# Patient Record
(demographics unavailable — no encounter records)

---

## 2025-01-07 NOTE — HISTORY OF PRESENT ILLNESS
[de-identified] : S/P left lumpectomy and sentinel node Now on chemo and herceptin Will get RT when chemo finishes No complaints

## 2025-01-07 NOTE — PHYSICAL EXAM
[Normal] : supple, no neck mass and thyroid not enlarged [Normal Supraclavicular Lymph Nodes] : normal supraclavicular lymph nodes [Normal Axillary Lymph Nodes] : normal axillary lymph nodes [Normal] : oriented to person, place and time, with appropriate affect [de-identified] : no masses

## 2025-01-07 NOTE — ASSESSMENT
[FreeTextEntry1] : IMP:  MACARIO on adjuvant chemotherapy and herceptin  Plan: Finish chemo Continue herceptin fro one year RT after chemo Arimidex after RT mammogram 6 months after RT

## 2025-01-27 NOTE — REVIEW OF SYSTEMS
[Negative] : Allergic/Immunologic [FreeTextEntry5] : HTN, HLD [FreeTextEntry7] : cholycystectomy [de-identified] : left breast lumpectomy in 9/2024, h/o chemo for breast cancer in early 1/2025

## 2025-01-27 NOTE — HISTORY OF PRESENT ILLNESS
[FreeTextEntry1] : 70 y/o female with HTN, HLD, h/o left breast lumpectomy (IDC, pT1cN0, ERPR positive, Her 2 positive), h/o chemo for left breast cancer in early 1/2025 presents to discuss radiation options for left breast cancer.  6/2024 -- mammography @[facility] showed BI-RADS 0. Left breast asymmetry and possible nodules. 7/2024 -- mammography showed left breast mass, BI-RADS 5. 7/2024 -- left breast biopsy showed IDC. ER, [place] and her 2 positive.  9/2024 -- left breast lumpectomy showed Final Diagnosis (pT1cN0, Negative margin, triple positive) 1.  Axillary sentinel lymph nodes, left, excision -   [place] lymph nodes, negative for carcinoma (0/4), pN0 -   Immunostains for pankeratin pending, addendum to follow 2.  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 -  See prior biopsy report and synoptic 3.  Left breast, resection: -   Focal adenosis with atypical apocrine metaplasia (atypical apocrine adenosis) -   Fibrocystic changes and microcalcifications -   Negative for in situ and invasive carcinoma 4-8, Left breast, superior, medial, inferior, lateral and deep margins: -   All margins are negative for carcinoma.  10/4/2024 Consultation. No breast pain. Pt may get chemo with Med Onc Dr Montoya @ [place].   1/22/2025 F/U. Pt completed chemo with Dr Montoya, here for RT plan.

## 2025-01-27 NOTE — HISTORY OF PRESENT ILLNESS
[FreeTextEntry1] : 70 y/o female with HTN, HLD, h/o left breast lumpectomy (IDC, pT1cN0, ERPR positive, Her 2 positive), h/o chemo for left breast cancer in early 1/2025 presents to discuss radiation options for left breast cancer.  6/2024 -- mammography @[facility] showed BI-RADS 0. Left breast asymmetry and possible nodules. 7/2024 -- mammography showed left breast mass, BI-RADS 5. 7/2024 -- left breast biopsy showed IDC. ER, [place] and her 2 positive.  9/2024 -- left breast lumpectomy showed Final Diagnosis (pT1cN0, Negative margin, triple positive) 1.  Axillary sentinel lymph nodes, left, excision -   [place] lymph nodes, negative for carcinoma (0/4), pN0 -   Immunostains for pankeratin pending, addendum to follow 2.  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 -  See prior biopsy report and synoptic 3.  Left breast, resection: -   Focal adenosis with atypical apocrine metaplasia (atypical apocrine adenosis) -   Fibrocystic changes and microcalcifications -   Negative for in situ and invasive carcinoma 4-8, Left breast, superior, medial, inferior, lateral and deep margins: -   All margins are negative for carcinoma.  10/4/2024 Consultation. No breast pain. Pt may get chemo with Med Onc Dr Montoya @ [place].   1/22/2025 F/U. Pt completed chemo with Dr Montoya, here for RT plan.

## 2025-01-27 NOTE — REVIEW OF SYSTEMS
[Negative] : Allergic/Immunologic [FreeTextEntry5] : HTN, HLD [FreeTextEntry7] : cholycystectomy [de-identified] : left breast lumpectomy in 9/2024, h/o chemo for breast cancer in early 1/2025

## 2025-02-20 NOTE — DISEASE MANAGEMENT
[Pathological] : TNM Stage: p [I] : I [TTNM] : 1c [NTNM] : 0 [MTNM] : x [de-identified] : 5240 cGy [de-identified] : left breast

## 2025-02-20 NOTE — REASON FOR VISIT
[Routine On-Treatment] : a routine on-treatment visit for [Breast Cancer] : breast cancer [Family Member] : family member [Language Line ] : provided by Language Line   [Time Spent: ____ minutes] : Total time spent using  services: [unfilled] minutes. The patient's primary language is not English thus required  services. [Interpreters_IDNumber] : [id number] [TWNoteComboBox1] : [place] citizen

## 2025-02-20 NOTE — REVIEW OF SYSTEMS
[Negative] : Allergic/Immunologic [Dysphagia: Grade 0] : Dysphagia: Grade 0 [Cough: Grade 0] : Cough: Grade 0 [Alopecia: Grade 0] : Alopecia: Grade 0 [Pruritus: Grade 0] : Pruritus: Grade 0 [Skin Atrophy: Grade 0] : Skin Atrophy: Grade 0 [Skin Hyperpigmentation: Grade 0] : Skin Hyperpigmentation: Grade 0 [Skin Induration: Grade 0] : Skin Induration: Grade 0 [Dermatitis Radiation: Grade 1 - Faint erythema or dry desquamation] : Dermatitis Radiation: Grade 1 - Faint erythema or dry desquamation [FreeTextEntry5] : HTN, HLD [FreeTextEntry7] : cholycystectomy [de-identified] : left breast lumpectomy in 9/2024, h/o chemo for breast cancer in early 1/2025

## 2025-02-20 NOTE — HISTORY OF PRESENT ILLNESS
[FreeTextEntry1] : 72 y/o female with HTN, HLD, h/o left breast lumpectomy (IDC, pT1cN0, ERPR positive, Her 2 positive), h/o chemo for left breast cancer in early 1/2025 presents to discuss radiation options for left breast cancer.  6/2024 -- mammography @[facility] showed BI-RADS 0. Left breast asymmetry and possible nodules. 7/2024 -- mammography showed left breast mass, BI-RADS 5. 7/2024 -- left breast biopsy showed IDC. ER, [place] and her 2 positive.  9/2024 -- left breast lumpectomy showed Final Diagnosis (pT1cN0, Negative margin, triple positive) 1.  Axillary sentinel lymph nodes, left, excision -   [place] lymph nodes, negative for carcinoma (0/4), pN0 -   Immunostains for pankeratin pending, addendum to follow 2.  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 -  See prior biopsy report and synoptic 3.  Left breast, resection: -   Focal adenosis with atypical apocrine metaplasia (atypical apocrine adenosis) -   Fibrocystic changes and microcalcifications -   Negative for in situ and invasive carcinoma 4-8, Left breast, superior, medial, inferior, lateral and deep margins: -   All margins are negative for carcinoma.  10/4/2024 Consultation. No breast pain. Pt may get chemo with Med Onc Dr Montoya @ [place].   1/22/2025 F/U. Pt completed chemo with Dr Montoya, here for RT plan.   2/20/2025 OTV. 7/20 Fx of RT to left breast were completed. c/o minor skin change. No pain. Recommended calendula cream.

## 2025-03-02 NOTE — REASON FOR VISIT
[Routine On-Treatment] : a routine on-treatment visit for [Breast Cancer] : breast cancer [Language Line ] : provided by Language Line   [Interpreters_IDNumber] : [id number] [TWNoteComboBox1] : Latvian

## 2025-03-02 NOTE — DISEASE MANAGEMENT
[Pathological] : TNM Stage: p [I] : I [TTNM] : 1c [NTNM] : 0 [MTNM] : x [de-identified] : 5240 cGy [de-identified] : left breast

## 2025-03-02 NOTE — DISEASE MANAGEMENT
[Pathological] : TNM Stage: p [I] : I [TTNM] : 1c [NTNM] : 0 [MTNM] : x [de-identified] : 5240 cGy [de-identified] : left breast

## 2025-03-02 NOTE — REASON FOR VISIT
[Routine On-Treatment] : a routine on-treatment visit for [Breast Cancer] : breast cancer [Language Line ] : provided by Language Line   [Interpreters_IDNumber] : [id number] [TWNoteComboBox1] : Jamaican

## 2025-03-02 NOTE — REVIEW OF SYSTEMS
[Negative] : Allergic/Immunologic [Dysphagia: Grade 0] : Dysphagia: Grade 0 [Cough: Grade 0] : Cough: Grade 0 [Alopecia: Grade 0] : Alopecia: Grade 0 [Pruritus: Grade 0] : Pruritus: Grade 0 [Skin Atrophy: Grade 0] : Skin Atrophy: Grade 0 [Skin Hyperpigmentation: Grade 1 - Hyperpigmentation covering <10% BSA; no psychosocial impact] : Skin Hyperpigmentation: Grade 1 - Hyperpigmentation covering <10% BSA; no psychosocial impact [Skin Induration: Grade 0] : Skin Induration: Grade 0 [Dermatitis Radiation: Grade 1 - Faint erythema or dry desquamation] : Dermatitis Radiation: Grade 1 - Faint erythema or dry desquamation [FreeTextEntry5] : HTN, HLD [FreeTextEntry7] : cholycystectomy [de-identified] : left breast lumpectomy in 9/2024, h/o chemo for breast cancer in early 1/2025

## 2025-03-02 NOTE — HISTORY OF PRESENT ILLNESS
[FreeTextEntry1] : 72 y/o female with HTN, HLD, h/o left breast lumpectomy (IDC, pT1cN0, ERPR positive, Her 2 positive), h/o chemo for left breast cancer in early 1/2025 presents to discuss radiation options for left breast cancer.  6/2024 -- mammography @[facility] showed BI-RADS 0. Left breast asymmetry and possible nodules. 7/2024 -- mammography showed left breast mass, BI-RADS 5. 7/2024 -- left breast biopsy showed IDC. ER, [place] and her 2 positive.  9/2024 -- left breast lumpectomy showed Final Diagnosis (pT1cN0, Negative margin, triple positive) 1.  Axillary sentinel lymph nodes, left, excision -   [place] lymph nodes, negative for carcinoma (0/4), pN0 -   Immunostains for pankeratin pending, addendum to follow 2.  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 -  See prior biopsy report and synoptic 3.  Left breast, resection: -   Focal adenosis with atypical apocrine metaplasia (atypical apocrine adenosis) -   Fibrocystic changes and microcalcifications -   Negative for in situ and invasive carcinoma 4-8, Left breast, superior, medial, inferior, lateral and deep margins: -   All margins are negative for carcinoma.  10/4/2024 Consultation. No breast pain. Pt may get chemo with Med Onc Dr Montoya @ [place].   1/22/2025 F/U. Pt completed chemo with Dr Montoya, here for RT plan.   2/27/2025 OTV. 10/20 Fx of RT to left breast were completed. c/o minor skin change. No pain. Recommended calendula cream.

## 2025-03-02 NOTE — REVIEW OF SYSTEMS
[Negative] : Allergic/Immunologic [Dysphagia: Grade 0] : Dysphagia: Grade 0 [Cough: Grade 0] : Cough: Grade 0 [Alopecia: Grade 0] : Alopecia: Grade 0 [Pruritus: Grade 0] : Pruritus: Grade 0 [Skin Atrophy: Grade 0] : Skin Atrophy: Grade 0 [Skin Hyperpigmentation: Grade 1 - Hyperpigmentation covering <10% BSA; no psychosocial impact] : Skin Hyperpigmentation: Grade 1 - Hyperpigmentation covering <10% BSA; no psychosocial impact [Skin Induration: Grade 0] : Skin Induration: Grade 0 [Dermatitis Radiation: Grade 1 - Faint erythema or dry desquamation] : Dermatitis Radiation: Grade 1 - Faint erythema or dry desquamation [FreeTextEntry5] : HTN, HLD [FreeTextEntry7] : cholycystectomy [de-identified] : left breast lumpectomy in 9/2024, h/o chemo for breast cancer in early 1/2025

## 2025-03-02 NOTE — HISTORY OF PRESENT ILLNESS
[FreeTextEntry1] : 72 y/o female with HTN, HLD, h/o left breast lumpectomy (IDC, pT1cN0, ERPR positive, Her 2 positive), h/o chemo for left breast cancer in early 1/2025 presents to discuss radiation options for left breast cancer.  6/2024 -- mammography @[facility] showed BI-RADS 0. Left breast asymmetry and possible nodules. 7/2024 -- mammography showed left breast mass, BI-RADS 5. 7/2024 -- left breast biopsy showed IDC. ER, [place] and her 2 positive.  9/2024 -- left breast lumpectomy showed Final Diagnosis (pT1cN0, Negative margin, triple positive) 1.  Axillary sentinel lymph nodes, left, excision -   [place] lymph nodes, negative for carcinoma (0/4), pN0 -   Immunostains for pankeratin pending, addendum to follow 2.  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 -  See prior biopsy report and synoptic 3.  Left breast, resection: -   Focal adenosis with atypical apocrine metaplasia (atypical apocrine adenosis) -   Fibrocystic changes and microcalcifications -   Negative for in situ and invasive carcinoma 4-8, Left breast, superior, medial, inferior, lateral and deep margins: -   All margins are negative for carcinoma.  10/4/2024 Consultation. No breast pain. Pt may get chemo with Med Onc Dr Montoya @ [place].   1/22/2025 F/U. Pt completed chemo with Dr Montoya, here for RT plan.   2/27/2025 OTV. 10/20 Fx of RT to left breast were completed. c/o minor skin change. No pain. Recommended calendula cream.

## 2025-03-06 NOTE — REVIEW OF SYSTEMS
[Negative] : Allergic/Immunologic [FreeTextEntry5] : HTN, HLD [FreeTextEntry7] : cholycystectomy [de-identified] : left breast lumpectomy in 9/2024, h/o chemo for breast cancer in early 1/2025 [Dysphagia: Grade 0] : Dysphagia: Grade 0 [Cough: Grade 0] : Cough: Grade 0 [Alopecia: Grade 0] : Alopecia: Grade 0 [Pruritus: Grade 0] : Pruritus: Grade 0 [Skin Atrophy: Grade 0] : Skin Atrophy: Grade 0 [Skin Hyperpigmentation: Grade 1 - Hyperpigmentation covering <10% BSA; no psychosocial impact] : Skin Hyperpigmentation: Grade 1 - Hyperpigmentation covering <10% BSA; no psychosocial impact [Skin Induration: Grade 0] : Skin Induration: Grade 0 [Dermatitis Radiation: Grade 2 - Moderate to brisk erythema; patchy moist desquamation, mostly confined to skin folds and creases; moderate edema] : Dermatitis Radiation: Grade 2 - Moderate to brisk erythema; patchy moist desquamation, mostly confined to skin folds and creases; moderate edema

## 2025-03-06 NOTE — HISTORY OF PRESENT ILLNESS
[FreeTextEntry1] : 72 y/o female with HTN, HLD, h/o left breast lumpectomy (IDC, pT1cN0, ERPR positive, Her 2 positive), h/o chemo for left breast cancer in early 1/2025 presents to discuss radiation options for left breast cancer.  6/2024 -- mammography @[facility] showed BI-RADS 0. Left breast asymmetry and possible nodules. 7/2024 -- mammography showed left breast mass, BI-RADS 5. 7/2024 -- left breast biopsy showed IDC. ER, [place] and her 2 positive.  9/2024 -- left breast lumpectomy showed Final Diagnosis (pT1cN0, Negative margin, triple positive) 1.  Axillary sentinel lymph nodes, left, excision -   [place] lymph nodes, negative for carcinoma (0/4), pN0 -   Immunostains for pankeratin pending, addendum to follow 2.  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 -  See prior biopsy report and synoptic 3.  Left breast, resection: -   Focal adenosis with atypical apocrine metaplasia (atypical apocrine adenosis) -   Fibrocystic changes and microcalcifications -   Negative for in situ and invasive carcinoma 4-8, Left breast, superior, medial, inferior, lateral and deep margins: -   All margins are negative for carcinoma.  10/4/2024 Consultation. No breast pain. Pt may get chemo with Med Onc Dr Montoya @ [place].   1/22/2025 F/U. Pt completed chemo with Dr Montoya, here for RT plan.   3/6/2025 OTV. 16/20 Fx of RT to left breast were completed. c/o mild skin change. No pain. Using calendula cream.

## 2025-03-06 NOTE — DISEASE MANAGEMENT
[Pathological] : TNM Stage: p [TTNM] : 1c [NTNM] : 0 [MTNM] : x [I] : I [de-identified] : 5240 cGy [de-identified] : left breast

## 2025-03-06 NOTE — REASON FOR VISIT
[Routine On-Treatment] : a routine on-treatment visit for [Breast Cancer] : breast cancer [Language Line ] : provided by Language Line   [Time Spent: ____ minutes] : Total time spent using  services: [unfilled] minutes. The patient's primary language is not English thus required  services. [Interpreters_IDNumber] : [id number] [TWNoteComboBox1] : [place]ese

## 2025-03-22 NOTE — DISEASE MANAGEMENT
[Pathological] : TNM Stage: p [I] : I [TTNM] : 1c [NTNM] : 0 [MTNM] : x [de-identified] : 5240 cGy [de-identified] : left breast

## 2025-03-22 NOTE — REVIEW OF SYSTEMS
[Negative] : Allergic/Immunologic [Dysphagia: Grade 0] : Dysphagia: Grade 0 [Cough: Grade 0] : Cough: Grade 0 [Alopecia: Grade 0] : Alopecia: Grade 0 [Pruritus: Grade 0] : Pruritus: Grade 0 [Skin Atrophy: Grade 0] : Skin Atrophy: Grade 0 [Skin Hyperpigmentation: Grade 1 - Hyperpigmentation covering <10% BSA; no psychosocial impact] : Skin Hyperpigmentation: Grade 1 - Hyperpigmentation covering <10% BSA; no psychosocial impact [Skin Induration: Grade 0] : Skin Induration: Grade 0 [Dermatitis Radiation: Grade 2 - Moderate to brisk erythema; patchy moist desquamation, mostly confined to skin folds and creases; moderate edema] : Dermatitis Radiation: Grade 2 - Moderate to brisk erythema; patchy moist desquamation, mostly confined to skin folds and creases; moderate edema [FreeTextEntry5] : HTN, HLD [FreeTextEntry7] : cholycystectomy [de-identified] : left breast lumpectomy in 9/2024, h/o chemo for breast cancer in early 1/2025

## 2025-03-22 NOTE — REVIEW OF SYSTEMS
[Negative] : Allergic/Immunologic [Dysphagia: Grade 0] : Dysphagia: Grade 0 [Cough: Grade 0] : Cough: Grade 0 [Alopecia: Grade 0] : Alopecia: Grade 0 [Pruritus: Grade 0] : Pruritus: Grade 0 [Skin Atrophy: Grade 0] : Skin Atrophy: Grade 0 [Skin Hyperpigmentation: Grade 1 - Hyperpigmentation covering <10% BSA; no psychosocial impact] : Skin Hyperpigmentation: Grade 1 - Hyperpigmentation covering <10% BSA; no psychosocial impact [Skin Induration: Grade 0] : Skin Induration: Grade 0 [Dermatitis Radiation: Grade 2 - Moderate to brisk erythema; patchy moist desquamation, mostly confined to skin folds and creases; moderate edema] : Dermatitis Radiation: Grade 2 - Moderate to brisk erythema; patchy moist desquamation, mostly confined to skin folds and creases; moderate edema [FreeTextEntry5] : HTN, HLD [FreeTextEntry7] : cholycystectomy [de-identified] : left breast lumpectomy in 9/2024, h/o chemo for breast cancer in early 1/2025

## 2025-03-22 NOTE — HISTORY OF PRESENT ILLNESS
[FreeTextEntry1] : 70 y/o female with HTN, HLD, h/o left breast lumpectomy (IDC, pT1cN0, ERPR positive, Her 2 positive), h/o chemo for left breast cancer in early 1/2025 presents to discuss radiation options for left breast cancer.  6/2024 -- mammography @[facility] showed BI-RADS 0. Left breast asymmetry and possible nodules. 7/2024 -- mammography showed left breast mass, BI-RADS 5. 7/2024 -- left breast biopsy showed IDC. ER, [place] and her 2 positive.  9/2024 -- left breast lumpectomy showed Final Diagnosis (pT1cN0, Negative margin, triple positive) 1.  Axillary sentinel lymph nodes, left, excision -   [place] lymph nodes, negative for carcinoma (0/4), pN0 -   Immunostains for pankeratin pending, addendum to follow 2.  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 -  See prior biopsy report and synoptic 3.  Left breast, resection: -   Focal adenosis with atypical apocrine metaplasia (atypical apocrine adenosis) -   Fibrocystic changes and microcalcifications -   Negative for in situ and invasive carcinoma 4-8, Left breast, superior, medial, inferior, lateral and deep margins: -   All margins are negative for carcinoma.  10/4/2024 Consultation. No breast pain. Pt may get chemo with Med Onc Dr Montoya @ [place].   1/22/2025 F/U. Pt completed chemo with Dr Montoya, here for RT plan.   3/12/2025 OTV. 20/20 Fx of RT to left breast were completed. c/o mild skin change. No pain. Using calendula cream.

## 2025-03-22 NOTE — HISTORY OF PRESENT ILLNESS
[FreeTextEntry1] : 72 y/o female with HTN, HLD, h/o left breast lumpectomy (IDC, pT1cN0, ERPR positive, Her 2 positive), h/o chemo for left breast cancer in early 1/2025 presents to discuss radiation options for left breast cancer.  6/2024 -- mammography @[facility] showed BI-RADS 0. Left breast asymmetry and possible nodules. 7/2024 -- mammography showed left breast mass, BI-RADS 5. 7/2024 -- left breast biopsy showed IDC. ER, [place] and her 2 positive.  9/2024 -- left breast lumpectomy showed Final Diagnosis (pT1cN0, Negative margin, triple positive) 1.  Axillary sentinel lymph nodes, left, excision -   [place] lymph nodes, negative for carcinoma (0/4), pN0 -   Immunostains for pankeratin pending, addendum to follow 2.  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 -  See prior biopsy report and synoptic 3.  Left breast, resection: -   Focal adenosis with atypical apocrine metaplasia (atypical apocrine adenosis) -   Fibrocystic changes and microcalcifications -   Negative for in situ and invasive carcinoma 4-8, Left breast, superior, medial, inferior, lateral and deep margins: -   All margins are negative for carcinoma.  10/4/2024 Consultation. No breast pain. Pt may get chemo with Med Onc Dr Montoya @ [place].   1/22/2025 F/U. Pt completed chemo with Dr Montoya, here for RT plan.   3/12/2025 OTV. 20/20 Fx of RT to left breast were completed. c/o mild skin change. No pain. Using calendula cream.

## 2025-03-22 NOTE — REASON FOR VISIT
[Routine On-Treatment] : a routine on-treatment visit for [Breast Cancer] : breast cancer [Language Line ] : provided by Language Line   [Interpreters_IDNumber] : [id number] [TWNoteComboBox1] : Jordanian

## 2025-03-22 NOTE — DISEASE MANAGEMENT
[Pathological] : TNM Stage: p [I] : I [TTNM] : 1c [NTNM] : 0 [MTNM] : x [de-identified] : 5240 cGy [de-identified] : left breast

## 2025-03-22 NOTE — REASON FOR VISIT
[Routine On-Treatment] : a routine on-treatment visit for [Breast Cancer] : breast cancer [Language Line ] : provided by Language Line   [Interpreters_IDNumber] : [id number] [TWNoteComboBox1] : Nepalese

## 2025-04-01 NOTE — ASSESSMENT
[FreeTextEntry1] : IMP: 70yo F w/ left triple positive IDC s/p left magseed lumpectomy & SLNB on 9/9/2024, path: - poorly differentiated IDC, 1.5 cm, focal peritumoral high-grade DCIS, final margins negative, 0/4 LN involved, pT1c N0  completed adjuvant chemo w/ Dr. Montoya @ [facility] 1/2025 completed RT to Left breast w/ Dr. Adhikari 3/2025  remains on Herceptin w/ Dr. Montoya (planned for 1 year)  PLAN: RTO Q3 months  Continue Herceptin for one year & start AI w/ Dr. Montoya  mammogram 6 months after RT (9/2025)

## 2025-04-01 NOTE — HISTORY OF PRESENT ILLNESS
[de-identified] : Ms. DEAN CRAIG is a 71-year-old woman here for a breast cancer follow-up.  PMH/PSH: HTN. HLD, cholecystectomy Famhx of sister w/ uterine cancer   Left US biopsy 7/2024 - IDC, ER+/TX+/HER+  s/p left magseed lumpectomy & SLNB on 9/9/2024, path: - poorly differentiated IDC, 1.5 cm, focal peritumoral high-grade DCIS, final margins negative, 0/4 LN involved, pT1c N0  completed adjuvant chemo w/ Dr. Montoya @ [facility] 1/2025 completed RT to Left breast w/ Dr. Adhikari 3/2025  remains on Herceptin w/ Dr. Montoya (planned for 1 year)

## 2025-04-01 NOTE — ASSESSMENT
[FreeTextEntry1] : IMP: 70yo F w/ left triple positive IDC s/p left magseed lumpectomy & SLNB on 9/9/2024, path: - poorly differentiated IDC, 1.5 cm, focal peritumoral high-grade DCIS, final margins negative, 0/4 LN involved, pT1c N0  completed adjuvant chemo w/ Dr. Montoya @ [facility] 1/2025 completed RT to Left breast w/ Dr. Adhikari 3/2025  remains on Herceptin w/ Dr. Montoya (planned for 1 year)  PLAN: RTO Q3 months  Continue Herceptin for one year & start AI w/ Dr. Montoya  mammogram 6 months after RT (9/2025)

## 2025-04-01 NOTE — HISTORY OF PRESENT ILLNESS
[de-identified] : Ms. DEAN CRAIG is a 71-year-old woman here for a breast cancer follow-up.  PMH/PSH: HTN. HLD, cholecystectomy Famhx of sister w/ uterine cancer   Left US biopsy 7/2024 - IDC, ER+/TN+/HER+  s/p left magseed lumpectomy & SLNB on 9/9/2024, path: - poorly differentiated IDC, 1.5 cm, focal peritumoral high-grade DCIS, final margins negative, 0/4 LN involved, pT1c N0  completed adjuvant chemo w/ Dr. Montoya @ [facility] 1/2025 completed RT to Left breast w/ Dr. Adhikari 3/2025  remains on Herceptin w/ Dr. Montoya (planned for 1 year)

## 2025-04-01 NOTE — PHYSICAL EXAM
[Normal] : supple, no neck mass and thyroid not enlarged [Normal Supraclavicular Lymph Nodes] : normal supraclavicular lymph nodes [Normal Axillary Lymph Nodes] : normal axillary lymph nodes [Normal] : oriented to person, place and time, with appropriate affect [de-identified] : no masses

## 2025-04-01 NOTE — PHYSICAL EXAM
[Normal] : supple, no neck mass and thyroid not enlarged [Normal Supraclavicular Lymph Nodes] : normal supraclavicular lymph nodes [Normal Axillary Lymph Nodes] : normal axillary lymph nodes [Normal] : oriented to person, place and time, with appropriate affect [de-identified] : no masses

## 2025-04-16 NOTE — VITALS
[Maximal Pain Intensity: 5/10] : 5/10 [Least Pain Intensity: 0/10] : 0/10 [Pain Location: ___] : Pain Location: [unfilled] [NoTreatment Scheduled] : no treatment scheduled [90: Able to carry normal activity; minor signs or symptoms of disease.] : 90: Able to carry normal activity; minor signs or symptoms of disease.

## 2025-04-22 NOTE — REVIEW OF SYSTEMS
[Dysphagia: Grade 0] : Dysphagia: Grade 0 [Cough: Grade 0] : Cough: Grade 0 [Alopecia: Grade 0] : Alopecia: Grade 0 [Pruritus: Grade 0] : Pruritus: Grade 0 [Skin Atrophy: Grade 0] : Skin Atrophy: Grade 0 [Skin Hyperpigmentation: Grade 1 - Hyperpigmentation covering <10% BSA; no psychosocial impact] : Skin Hyperpigmentation: Grade 1 - Hyperpigmentation covering <10% BSA; no psychosocial impact [Skin Induration: Grade 0] : Skin Induration: Grade 0 [Hot Flashes] : hot flashes [Negative] : Gastrointestinal [Nausea: Grade 0] : Nausea: Grade 0 [Vomiting: Grade 0] : Vomiting: Grade 0 [Fatigue: Grade 0] : Fatigue: Grade 0 [Localized Edema: Grade 0] : Localized Edema: Grade 0  [Breast Pain: Grade 1 - Mild pain] : Breast Pain: Grade 1 - Mild pain [Headache: Grade 0] : Headache: Grade 0 [Lethargy: Grade 0] : Lethargy: Grade 0 [Dyspnea: Grade 0] : Dyspnea: Grade 0 [Dermatitis Radiation: Grade 0] : Dermatitis Radiation: Grade 0 [Fever] : no fever [Recent Change In Weight] : ~T no recent weight change [FreeTextEntry9] : bilateral weak hand  . Able to button clothes.  [de-identified] : left breast lumpectomy in 9/2024, h/o chemo for breast cancer in early 1/2025 [de-identified] : ambulates cane

## 2025-04-22 NOTE — REASON FOR VISIT
[Post-Treatment Evaluation] : post-treatment evaluation for [Breast Cancer] : breast cancer [Family Member] : family member [Patient Declined  Services] : - None: Patient declined  services [Interpreters_Relationshiptopatient] : Daughter Maddy [TWNoteComboBox1] : Greenlandic

## 2025-04-22 NOTE — HISTORY OF PRESENT ILLNESS
[FreeTextEntry1] : 70 y/o female with HTN, HLD, h/o left breast lumpectomy (IDC, pT1cN0, ERPR positive, Her 2 positive), h/o chemo for left breast cancer in early 1/2025 presents to discuss radiation options for left breast cancer.  6/2024 -- mammography @[facility] showed BI-RADS 0. Left breast asymmetry and possible nodules. 7/2024 -- mammography showed left breast mass, BI-RADS 5. 7/2024 -- left breast biopsy showed IDC. ER, [place] and her 2 positive.  9/2024 -- left breast lumpectomy showed Final Diagnosis (pT1cN0, Negative margin, triple positive) 1.  Axillary sentinel lymph nodes, left, excision -   [place] lymph nodes, negative for carcinoma (0/4), pN0 -   Immunostains for pankeratin pending, addendum to follow 2.  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 -  See prior biopsy report and synoptic 3.  Left breast, resection: -   Focal adenosis with atypical apocrine metaplasia (atypical apocrine adenosis) -   Fibrocystic changes and microcalcifications -   Negative for in situ and invasive carcinoma 4-8, Left breast, superior, medial, inferior, lateral and deep margins: -   All margins are negative for carcinoma.  4/16/25 Presents for PTE. Pt completed radiation therapy 5240cGy over 20 fx  to left breast  2/11-3/12/25. Pt is Azeri speaking, translated by daughter Maddy per pt preference. Pt endorses feeling okay , energy is fair, good appetite. Intermittent pain to left areolar area , 5/10, relieved with calundula cream. (+) hot flashes describes it as feeling warmth to her head. Weak  to bilateral hands x months. Pt is able to button her clothes. Fingernails appear discolored, with some having white opaque nail beds.  Pt on Herceptin planned for 1 year , follows [facility]  Dr. Montoya . Pt follows [facility]urg[facility] Dr. Aponte.

## 2025-04-22 NOTE — DISEASE MANAGEMENT
[Pathological] : TNM Stage: p [I] : I [TTNM] : 1c [NTNM] : 0 [MTNM] : x [de-identified] : 5240 cGy [de-identified] : left breast

## 2025-04-22 NOTE — DISEASE MANAGEMENT
[Pathological] : TNM Stage: p [I] : I [TTNM] : 1c [NTNM] : 0 [MTNM] : x [de-identified] : 5240 cGy [de-identified] : left breast

## 2025-04-22 NOTE — REASON FOR VISIT
[Post-Treatment Evaluation] : post-treatment evaluation for [Breast Cancer] : breast cancer [Family Member] : family member [Patient Declined  Services] : - None: Patient declined  services [Interpreters_Relationshiptopatient] : Daughter Maddy [TWNoteComboBox1] : Turkmen

## 2025-04-22 NOTE — HISTORY OF PRESENT ILLNESS
[FreeTextEntry1] : 70 y/o female with HTN, HLD, h/o left breast lumpectomy (IDC, pT1cN0, ERPR positive, Her 2 positive), h/o chemo for left breast cancer in early 1/2025 presents to discuss radiation options for left breast cancer.  6/2024 -- mammography @[facility] showed BI-RADS 0. Left breast asymmetry and possible nodules. 7/2024 -- mammography showed left breast mass, BI-RADS 5. 7/2024 -- left breast biopsy showed IDC. ER, [place] and her 2 positive.  9/2024 -- left breast lumpectomy showed Final Diagnosis (pT1cN0, Negative margin, triple positive) 1.  Axillary sentinel lymph nodes, left, excision -   [place] lymph nodes, negative for carcinoma (0/4), pN0 -   Immunostains for pankeratin pending, addendum to follow 2.  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 -  See prior biopsy report and synoptic 3.  Left breast, resection: -   Focal adenosis with atypical apocrine metaplasia (atypical apocrine adenosis) -   Fibrocystic changes and microcalcifications -   Negative for in situ and invasive carcinoma 4-8, Left breast, superior, medial, inferior, lateral and deep margins: -   All margins are negative for carcinoma.  4/16/25 Presents for PTE. Pt completed radiation therapy 5240cGy over 20 fx  to left breast  2/11-3/12/25. Pt is Maltese speaking, translated by daughter Maddy per pt preference. Pt endorses feeling okay , energy is fair, good appetite. Intermittent pain to left areolar area , 5/10, relieved with calundula cream. (+) hot flashes describes it as feeling warmth to her head. Weak  to bilateral hands x months. Pt is able to button her clothes. Fingernails appear discolored, with some having white opaque nail beds.  Pt on Herceptin planned for 1 year , follows [facility]  Dr. Montoya . Pt follows [facility]urg[facility] Dr. Aponte.

## 2025-04-22 NOTE — PHYSICAL EXAM
[General Appearance - Well Developed] : well developed [General Appearance - In No Acute Distress] : in no acute distress [] : no respiratory distress [Respiration, Rhythm And Depth] : normal respiratory rhythm and effort [Auscultation Breath Sounds / Voice Sounds] : lungs were clear to auscultation bilaterally [Heart Rate And Rhythm] : heart rate and rhythm were normal [Heart Sounds] : normal S1 and S2 [Murmurs] : no murmurs present [Bowel Sounds] : normal bowel sounds [Abdomen Soft] : soft [Nondistended] : nondistended [Musculoskeletal - Swelling] : no joint swelling [Skin Color & Pigmentation] : normal skin color and pigmentation [No Focal Deficits] : no focal deficits [Oriented To Time, Place, And Person] : oriented to person, place, and time [Normal] : no palpable adenopathy [de-identified] : left breast with heal scar, nontender, nonerythematous, nonedematous

## 2025-04-22 NOTE — REVIEW OF SYSTEMS
[Dysphagia: Grade 0] : Dysphagia: Grade 0 [Cough: Grade 0] : Cough: Grade 0 [Alopecia: Grade 0] : Alopecia: Grade 0 [Pruritus: Grade 0] : Pruritus: Grade 0 [Skin Atrophy: Grade 0] : Skin Atrophy: Grade 0 [Skin Hyperpigmentation: Grade 1 - Hyperpigmentation covering <10% BSA; no psychosocial impact] : Skin Hyperpigmentation: Grade 1 - Hyperpigmentation covering <10% BSA; no psychosocial impact [Skin Induration: Grade 0] : Skin Induration: Grade 0 [Hot Flashes] : hot flashes [Negative] : Gastrointestinal [Nausea: Grade 0] : Nausea: Grade 0 [Vomiting: Grade 0] : Vomiting: Grade 0 [Fatigue: Grade 0] : Fatigue: Grade 0 [Localized Edema: Grade 0] : Localized Edema: Grade 0  [Breast Pain: Grade 1 - Mild pain] : Breast Pain: Grade 1 - Mild pain [Headache: Grade 0] : Headache: Grade 0 [Lethargy: Grade 0] : Lethargy: Grade 0 [Dyspnea: Grade 0] : Dyspnea: Grade 0 [Dermatitis Radiation: Grade 0] : Dermatitis Radiation: Grade 0 [Fever] : no fever [Recent Change In Weight] : ~T no recent weight change [FreeTextEntry9] : bilateral weak hand  . Able to button clothes.  [de-identified] : left breast lumpectomy in 9/2024, h/o chemo for breast cancer in early 1/2025 [de-identified] : ambulates cane

## 2025-04-22 NOTE — PHYSICAL EXAM
[General Appearance - Well Developed] : well developed [General Appearance - In No Acute Distress] : in no acute distress [] : no respiratory distress [Respiration, Rhythm And Depth] : normal respiratory rhythm and effort [Auscultation Breath Sounds / Voice Sounds] : lungs were clear to auscultation bilaterally [Heart Rate And Rhythm] : heart rate and rhythm were normal [Heart Sounds] : normal S1 and S2 [Murmurs] : no murmurs present [Bowel Sounds] : normal bowel sounds [Abdomen Soft] : soft [Nondistended] : nondistended [Musculoskeletal - Swelling] : no joint swelling [Skin Color & Pigmentation] : normal skin color and pigmentation [No Focal Deficits] : no focal deficits [Oriented To Time, Place, And Person] : oriented to person, place, and time [Normal] : no palpable adenopathy [de-identified] : left breast with heal scar, nontender, nonerythematous, nonedematous